# Patient Record
Sex: MALE | Race: BLACK OR AFRICAN AMERICAN | URBAN - METROPOLITAN AREA
[De-identification: names, ages, dates, MRNs, and addresses within clinical notes are randomized per-mention and may not be internally consistent; named-entity substitution may affect disease eponyms.]

---

## 2020-07-13 ENCOUNTER — OFFICE VISIT (OUTPATIENT)
Dept: URBAN - METROPOLITAN AREA CLINIC 82 | Facility: CLINIC | Age: 75
End: 2020-07-13

## 2020-07-15 ENCOUNTER — OFFICE VISIT (OUTPATIENT)
Dept: URBAN - METROPOLITAN AREA CLINIC 115 | Facility: CLINIC | Age: 75
End: 2020-07-15

## 2020-07-23 ENCOUNTER — TELEPHONE ENCOUNTER (OUTPATIENT)
Dept: URBAN - METROPOLITAN AREA CLINIC 82 | Facility: CLINIC | Age: 75
End: 2020-07-23

## 2020-07-29 ENCOUNTER — OFFICE VISIT (OUTPATIENT)
Dept: URBAN - METROPOLITAN AREA CLINIC 115 | Facility: CLINIC | Age: 75
End: 2020-07-29

## 2020-08-03 ENCOUNTER — OFFICE VISIT (OUTPATIENT)
Dept: URBAN - METROPOLITAN AREA CLINIC 115 | Facility: CLINIC | Age: 75
End: 2020-08-03
Payer: MEDICARE

## 2020-08-03 DIAGNOSIS — K62.5 RECTAL BLEEDING: ICD-10-CM

## 2020-08-03 DIAGNOSIS — K59.1 FUNCTIONAL DIARRHEA: ICD-10-CM

## 2020-08-03 PROCEDURE — G9903 PT SCRN TBCO ID AS NON USER: HCPCS | Performed by: INTERNAL MEDICINE

## 2020-08-03 PROCEDURE — 99213 OFFICE O/P EST LOW 20 MIN: CPT | Performed by: INTERNAL MEDICINE

## 2020-08-03 PROCEDURE — G8427 DOCREV CUR MEDS BY ELIG CLIN: HCPCS | Performed by: INTERNAL MEDICINE

## 2020-08-03 PROCEDURE — G8420 CALC BMI NORM PARAMETERS: HCPCS | Performed by: INTERNAL MEDICINE

## 2020-08-03 RX ORDER — LISINOPRIL 20 MG/1
TABLET ORAL
Qty: 0 | Refills: 0 | Status: ACTIVE | COMMUNITY
Start: 1900-01-01

## 2020-08-03 RX ORDER — SEVELAMER CARBONATE 800 MG/1
TAKE 2 TABLETS (1,600 MG) BY ORAL ROUTE 3 TIMES PER DAY WITH MEALS TABLET, FILM COATED ORAL
Qty: 0 | Refills: 0 | Status: ACTIVE | COMMUNITY
Start: 1900-01-01

## 2020-08-03 RX ORDER — TRAZODONE HYDROCHLORIDE 50 MG/1
TABLET ORAL
Qty: 0 | Refills: 0 | Status: ACTIVE | COMMUNITY
Start: 1900-01-01

## 2020-08-03 RX ORDER — ETELCALCETIDE 5 MG/ML
INJECTION, SOLUTION INTRAVENOUS
Qty: 0 | Refills: 0 | Status: ACTIVE | COMMUNITY
Start: 1900-01-01

## 2020-08-03 NOTE — PHYSICAL EXAM HENT:
Head,  normocephalic,  atraumatic,  Face,  Face within normal limits,  Ears,  External ears within normal limits,  Nose/Nasopharynx,  External nose  normal appearance,  nares patent,  no nasal discharge,  Mouth and Throat,  Oral cavity appearance normal,  Breath odor normal,  Lips,  Appearance normal

## 2020-08-03 NOTE — PHYSICAL EXAM GASTROINTESTINAL
Abdomen , soft, nontender, nondistended , no guarding or rigidity , no masses palpable , normal bowel sounds , Liver and Spleen , no hepatomegaly present , no hepatosplenomegaly , liver nontender , spleen not palpable

## 2020-08-03 NOTE — PHYSICAL EXAM CONSTITUTIONAL:
well developed, well nourished , in no acute distress , ambulating without difficulty , normal communication ability

## 2020-08-03 NOTE — HPI-TODAY'S VISIT:
Mr. Jackson presents today in hospital follow-up.  During his admission this month he was found to have rectal bleeding.  I did a flexible sigmoidoscopy, it found brown stool and no evidence of active bleeding.  He does have a large prolapsed hemorrhoid with excoriation.  He was treated with sitz bath's and hydrocortisone suppositories. Currently he denies rectal bleeding.  He is however having diarrhea 6-8 times per day since hospital discharge.  He describes it as a tannish color.  He is very fatigued as he was up most of the night having diarrhea.

## 2020-08-04 ENCOUNTER — TELEPHONE ENCOUNTER (OUTPATIENT)
Dept: URBAN - METROPOLITAN AREA CLINIC 92 | Facility: CLINIC | Age: 75
End: 2020-08-04

## 2020-08-04 LAB
HEMATOCRIT: 24.8
HEMOGLOBIN: 8.2
MCH: 29.7
MCHC: 33.1
MCV: 90
NRBC: (no result)
PLATELETS: 238
RBC: 2.76
RDW: 14.6
WBC: 5.5

## 2020-08-31 ENCOUNTER — OFFICE VISIT (OUTPATIENT)
Dept: URBAN - METROPOLITAN AREA CLINIC 115 | Facility: CLINIC | Age: 75
End: 2020-08-31

## 2020-08-31 RX ORDER — TRAZODONE HYDROCHLORIDE 50 MG/1
TABLET ORAL
Qty: 0 | Refills: 0 | Status: ACTIVE | COMMUNITY
Start: 1900-01-01

## 2020-08-31 RX ORDER — SEVELAMER CARBONATE 800 MG/1
TAKE 2 TABLETS (1,600 MG) BY ORAL ROUTE 3 TIMES PER DAY WITH MEALS TABLET, FILM COATED ORAL
Qty: 0 | Refills: 0 | Status: ACTIVE | COMMUNITY
Start: 1900-01-01

## 2020-08-31 RX ORDER — ETELCALCETIDE 5 MG/ML
INJECTION, SOLUTION INTRAVENOUS
Qty: 0 | Refills: 0 | Status: ACTIVE | COMMUNITY
Start: 1900-01-01

## 2020-08-31 RX ORDER — LISINOPRIL 20 MG/1
TABLET ORAL
Qty: 0 | Refills: 0 | Status: ACTIVE | COMMUNITY
Start: 1900-01-01

## 2020-08-31 NOTE — HPI-TODAY'S VISIT:
75-year-old black male with end-stage kidney disease on hemodialysis presents in follow-up of diarrhea. I last saw him while he was hospitalized July 2020.  At that time he complained of rectal bleeding.  I did a flexible sigmoidoscopy which found brown stool, and excoriated large prolapsing hemorrhoids.  I recommended that he be seen by colorectal surgeon to consider hemorrhoidectomy. 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  These have been treated with APC.

## 2020-09-11 ENCOUNTER — OFFICE VISIT (OUTPATIENT)
Dept: URBAN - METROPOLITAN AREA TELEHEALTH 2 | Facility: TELEHEALTH | Age: 75
End: 2020-09-11

## 2021-04-01 ENCOUNTER — DASHBOARD ENCOUNTERS (OUTPATIENT)
Age: 76
End: 2021-04-01

## 2021-04-01 ENCOUNTER — OFFICE VISIT (OUTPATIENT)
Dept: URBAN - METROPOLITAN AREA TELEHEALTH 2 | Facility: TELEHEALTH | Age: 76
End: 2021-04-01
Payer: MEDICARE

## 2021-04-01 DIAGNOSIS — K55.20 AVM (ARTERIOVENOUS MALFORMATION) OF COLON: ICD-10-CM

## 2021-04-01 DIAGNOSIS — K29.50 CHRONIC GASTRITIS, PRESENCE OF BLEEDING UNSPECIFIED, UNSPECIFIED GASTRITIS TYPE: ICD-10-CM

## 2021-04-01 DIAGNOSIS — D50.0 IRON DEFICIENCY ANEMIA DUE TO CHRONIC BLOOD LOSS: ICD-10-CM

## 2021-04-01 DIAGNOSIS — K62.5 RECTAL BLEEDING: ICD-10-CM

## 2021-04-01 PROBLEM — 427199002: Status: ACTIVE | Noted: 2021-04-01

## 2021-04-01 PROBLEM — 8493009: Status: ACTIVE | Noted: 2021-04-01

## 2021-04-01 PROBLEM — 721688004: Status: ACTIVE | Noted: 2021-04-01

## 2021-04-01 PROBLEM — 724556004: Status: ACTIVE | Noted: 2021-04-01

## 2021-04-01 PROCEDURE — 99213 OFFICE O/P EST LOW 20 MIN: CPT | Performed by: INTERNAL MEDICINE

## 2021-04-01 RX ORDER — TRAZODONE HYDROCHLORIDE 50 MG/1
TABLET ORAL
Qty: 0 | Refills: 0 | Status: ACTIVE | COMMUNITY
Start: 1900-01-01

## 2021-04-01 RX ORDER — ETELCALCETIDE 5 MG/ML
INJECTION, SOLUTION INTRAVENOUS
Qty: 0 | Refills: 0 | Status: ACTIVE | COMMUNITY
Start: 1900-01-01

## 2021-04-01 RX ORDER — LISINOPRIL 20 MG/1
TABLET ORAL
Qty: 0 | Refills: 0 | Status: ON HOLD | COMMUNITY
Start: 1900-01-01

## 2021-04-01 RX ORDER — SEVELAMER CARBONATE 800 MG/1
TAKE 2 TABLETS (1,600 MG) BY ORAL ROUTE 3 TIMES PER DAY WITH MEALS TABLET, FILM COATED ORAL
Qty: 0 | Refills: 0 | Status: ACTIVE | COMMUNITY
Start: 1900-01-01

## 2021-04-01 NOTE — HPI-TODAY'S VISIT:
Mr. Jackson was seen over telehealth accompanied by his wife.  Patient appeared very lethargic as per his wife he had had a PT session and he was very tired.  He was hospitalized at [facility] for shortness of breath and he was found to be anemic.  During the hospitalization he did not allow any testing or procedures done.  He was noted to have a low hemoglobin and he was given blood transfusion.  He had episodes of delirium.  And hence the procedures were not offered as per wife prior to the admission to the ER in mid March he had an episode of dark stool however it has resolved completely and he has been having brown stools.  He is not: Able to walk to the bathroom and hence he had a bedside commode.  Wife is helps him with the bowel movements and she did not notice any black tarry stools or bloody stools.  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  However most recent further work-up had been unremarkable.  Patient reports having fatigue tiredness and weakness.

## 2021-06-24 ENCOUNTER — OUT OF OFFICE VISIT (OUTPATIENT)
Dept: URBAN - METROPOLITAN AREA MEDICAL CENTER 31 | Facility: MEDICAL CENTER | Age: 76
End: 2021-06-24
Payer: MEDICARE

## 2021-06-24 DIAGNOSIS — R06.89 ABNORMAL BREATH SOUNDS: ICD-10-CM

## 2021-06-24 DIAGNOSIS — E43 PROTEIN-CALORIE MALNUTRITION, SEVERE: ICD-10-CM

## 2021-06-24 DIAGNOSIS — R62.7 ADULT FAILURE TO THRIVE: ICD-10-CM

## 2021-06-24 DIAGNOSIS — R18.8 ABDOMINAL FLUID COLLECTION: ICD-10-CM

## 2021-06-24 PROCEDURE — 99222 1ST HOSP IP/OBS MODERATE 55: CPT | Performed by: INTERNAL MEDICINE

## 2021-06-24 PROCEDURE — G8427 DOCREV CUR MEDS BY ELIG CLIN: HCPCS | Performed by: INTERNAL MEDICINE
